# Patient Record
Sex: FEMALE | Race: WHITE | NOT HISPANIC OR LATINO | URBAN - NONMETROPOLITAN AREA
[De-identification: names, ages, dates, MRNs, and addresses within clinical notes are randomized per-mention and may not be internally consistent; named-entity substitution may affect disease eponyms.]

---

## 2020-07-01 ENCOUNTER — IMPORTED ENCOUNTER (OUTPATIENT)
Dept: URBAN - NONMETROPOLITAN AREA CLINIC 1 | Facility: CLINIC | Age: 50
End: 2020-07-01

## 2020-07-01 PROBLEM — H52.4: Noted: 2020-07-01

## 2020-07-01 PROBLEM — H25.013: Noted: 2020-07-01

## 2020-07-01 PROCEDURE — 92004 COMPRE OPH EXAM NEW PT 1/>: CPT

## 2020-07-01 PROCEDURE — 92015 DETERMINE REFRACTIVE STATE: CPT

## 2020-07-01 NOTE — PATIENT DISCUSSION
Presbyopia OU - Discussed diagnosis in detail with patient - New glasses RX given today - Continue to monitor - RTC 1 year complete Combined Cataracts OU - Discussed diagnosis in detail with patient - Discussed signs and symptoms of progression - Discussed UV protection - No treatment needed at this time - Continue to monitor

## 2021-11-19 ENCOUNTER — IMPORTED ENCOUNTER (OUTPATIENT)
Dept: URBAN - NONMETROPOLITAN AREA CLINIC 1 | Facility: CLINIC | Age: 51
End: 2021-11-19

## 2021-11-19 PROBLEM — H10.423: Noted: 2021-11-19

## 2021-11-19 PROBLEM — H52.4: Noted: 2021-11-19

## 2021-11-19 PROBLEM — H25.013: Noted: 2021-11-19

## 2021-11-19 PROCEDURE — 99213 OFFICE O/P EST LOW 20 MIN: CPT

## 2021-11-19 NOTE — PATIENT DISCUSSION
Allergies / REEMA - Discussed diagnosis in detail with patient- Discussed signs and symptoms of progression- Recommend patient drinking plenty of water and starting Omega 3’s - Recommend Refresh or Systane  throughout the day- Start Alrex TID OU RX sent to pharmacy and coupon given today - Once done with Alrex may switch to [employer] or [employer]- Recommend cool compresses - May try Lumify for redness coupon given - Continue to monitorPresbyopia OU - Discussed diagnosis in detail with patient - New glasses RX given today - Continue to monitor - RTC 1 year complete Combined Cataracts OU - Discussed diagnosis in detail with patient - Discussed signs and symptoms of progression - Discussed UV protection - No treatment needed at this time - Continue to monitor

## 2021-12-21 ENCOUNTER — IMPORTED ENCOUNTER (OUTPATIENT)
Dept: URBAN - NONMETROPOLITAN AREA CLINIC 1 | Facility: CLINIC | Age: 51
End: 2021-12-21

## 2021-12-21 PROCEDURE — 99213 OFFICE O/P EST LOW 20 MIN: CPT

## 2021-12-21 NOTE — PATIENT DISCUSSION
Allergies / REEMA - Discussed diagnosis in detail with patient- Discussed signs and symptoms of progression- Recommend patient drinking plenty of water and starting Omega 3’s - Recommend Refresh or Systane  throughout the day- D/C Daphne patient states RX was 590.00 and 540.00 with chris- D/C [employer] or [employer]- D/C Lumify- Start Pred Forte every 2 hours while awake handwritten RX given - Patint to call me on Thursday and will discuss how to start tapering drop - Recommend cool compresses - Continue to monitor- RTC 1 week F/U PREVIOUS NOTES Presbyopia OU - Discussed diagnosis in detail with patient - New glasses RX given today - Continue to monitor - RTC 1 year complete Combined Cataracts OU - Discussed diagnosis in detail with patient - Discussed signs and symptoms of progression - Discussed UV protection - No treatment needed at this time - Continue to monitor

## 2021-12-28 ENCOUNTER — IMPORTED ENCOUNTER (OUTPATIENT)
Dept: URBAN - NONMETROPOLITAN AREA CLINIC 1 | Facility: CLINIC | Age: 51
End: 2021-12-28

## 2021-12-28 PROCEDURE — 99213 OFFICE O/P EST LOW 20 MIN: CPT

## 2021-12-28 NOTE — PATIENT DISCUSSION
Allergies / REEMA - Discussed diagnosis in detail with patient- Discussed signs and symptoms of progression- Recommend patient drinking plenty of water and starting Omega 3’s - Recommend Refresh or Systane  throughout the day- D/C Daphne patient states RX was 590.00 and 540.00 with coupon- D/C Lumify- Start slow taper Pred Forte QID x 3-4 days TID x 3-4 days BID x 3-4 days QD x 3-4 days - Start [employer] or [employer] once get Pred Forte down to BID - Continue to monitor- RTC A/S or PRN PREVIOUS NOTES Presbyopia OU - Discussed diagnosis in detail with patient - New glasses RX given today - Continue to monitor - RTC 1 year complete Combined Cataracts OU - Discussed diagnosis in detail with patient - Discussed signs and symptoms of progression - Discussed UV protection - No treatment needed at this time - Continue to monitor

## 2022-04-09 ASSESSMENT — TONOMETRY
OD_IOP_MMHG: 20
OD_IOP_MMHG: 16
OD_IOP_MMHG: 18
OS_IOP_MMHG: 16
OS_IOP_MMHG: 18
OD_IOP_MMHG: 14
OS_IOP_MMHG: 20
OS_IOP_MMHG: 14

## 2022-04-09 ASSESSMENT — VISUAL ACUITY
OS_CC: 20/25-
OD_CC: 20/25-1
OS_CC: 20/20
OD_CC: 20/20-
OD_CC: 20/30+1
OD_CC: 20/20-
OS_CC: 20/25-1
OS_CC: 20/25

## 2022-06-08 ENCOUNTER — FOLLOW UP (OUTPATIENT)
Dept: URBAN - NONMETROPOLITAN AREA CLINIC 1 | Facility: CLINIC | Age: 52
End: 2022-06-08

## 2022-06-08 DIAGNOSIS — H10.423: ICD-10-CM

## 2022-06-08 DIAGNOSIS — H25.013: ICD-10-CM

## 2022-06-08 PROCEDURE — 99213 OFFICE O/P EST LOW 20 MIN: CPT

## 2022-06-08 ASSESSMENT — VISUAL ACUITY
OD_SC: 20/25
OS_SC: 20/25-1

## 2022-06-08 ASSESSMENT — TONOMETRY
OD_IOP_MMHG: 14
OS_IOP_MMHG: 14

## 2024-01-17 ENCOUNTER — EMERGENCY VISIT (OUTPATIENT)
Dept: URBAN - NONMETROPOLITAN AREA CLINIC 1 | Facility: CLINIC | Age: 54
End: 2024-01-17

## 2024-01-17 DIAGNOSIS — H10.44: ICD-10-CM

## 2024-01-17 PROCEDURE — 99214 OFFICE O/P EST MOD 30 MIN: CPT

## 2024-01-17 ASSESSMENT — VISUAL ACUITY
OS_SC: 20/80
OU_SC: 20/80
OS_PH: 20/50
OD_PH: 20/40
OD_SC: 20/150

## 2024-01-17 ASSESSMENT — TONOMETRY
OS_IOP_MMHG: 16
OD_IOP_MMHG: 16